# Patient Record
Sex: FEMALE | Race: WHITE
[De-identification: names, ages, dates, MRNs, and addresses within clinical notes are randomized per-mention and may not be internally consistent; named-entity substitution may affect disease eponyms.]

---

## 2017-11-14 ENCOUNTER — HOSPITAL ENCOUNTER (OUTPATIENT)
Dept: HOSPITAL 62 - WI | Age: 62
End: 2017-11-14
Attending: NURSE PRACTITIONER
Payer: MEDICARE

## 2017-11-14 DIAGNOSIS — Z12.31: Primary | ICD-10-CM

## 2017-11-14 PROCEDURE — G0202 SCR MAMMO BI INCL CAD: HCPCS

## 2017-11-14 PROCEDURE — 77067 SCR MAMMO BI INCL CAD: CPT

## 2017-11-14 NOTE — WOMENS IMAGING REPORT
EXAM DESCRIPTION:  BILAT SCREENING MAMMO W/CAD



COMPLETED DATE/TIME:  11/14/2017 7:11 am



REASON FOR STUDY:  SCREENING MAMMO Z12.31  ENCNTR SCREEN MAMMOGRAM FOR MALIGNANT NEOPLASM OF SONYA



COMPARISON:  September 2016



TECHNIQUE:  Standard craniocaudal and mediolateral oblique views of each breast recorded using digita
l acquisition.



LIMITATIONS:  None.



FINDINGS:  No masses, calcifications or architectural distortion. No areas of suspicion.

Read with the assistance of CAD.

.[facility] - R2 Cenova Version 1.3

.[facility] - R2 Cenova Version 1.3

.[facility] - R2 Cenova Version 2.4

.[facility] - R2 Cenova Version 2.4

.[facility] - R2  Version 9.2



IMPRESSION:  NORMAL MAMMOGRAM.  BIRADS 1.



BREAST DENSITY:  b. There are scattered areas of fibroglandular density.



BIRAD:  1 NEGATIVE



RECOMMENDATION:  ROUTINE SCREENING



COMMENT:  The patient has been notified of the results by letter per SA requirements. Additional no
tification policies are in place for contacting patient with suspicious or incomplete findings.

Quality ID #225: The American College of Radiology recommends an annual screening mammogram for women
 aged 40 years or over. This facility utilizes a reminder system to ensure that all patients receive 
reminder letters, and/or direct phone calls for appointments. This includes reminders for routine scr
eening mammograms, diagnostic mammograms, or other Breast Imaging Interventions when appropriate.  Th
is patient will be placed in the appropriate reminder system.

The American College of Radiology (ACR) has developed recommendations for screening MRI of the breast
s in certain patient populations, to be used in conjunction with mammography.  Breast MRI surveillanc
e may be appropriate for women with more than 20% lifetime risk of developing breast cancer  as deter
mined by genetic testing, significant family history of the disease, or history of mantle radiation f
or Hodgkins Disease.  ACR Practice Guidelines 2008.



TECHNICAL DOCUMENTATION:  FINDING NUMBER: (1)

ASSESSMENT: (1)

JOB ID:  [id number]

 2011 [employer]- All Rights Reserved

## 2018-04-04 ENCOUNTER — HOSPITAL ENCOUNTER (EMERGENCY)
Dept: HOSPITAL 62 - ER | Age: 63
Discharge: HOME | End: 2018-04-04
Payer: MEDICARE

## 2018-04-04 VITALS — DIASTOLIC BLOOD PRESSURE: 87 MMHG | SYSTOLIC BLOOD PRESSURE: 123 MMHG

## 2018-04-04 DIAGNOSIS — R50.9: ICD-10-CM

## 2018-04-04 DIAGNOSIS — F17.200: ICD-10-CM

## 2018-04-04 DIAGNOSIS — J20.9: Primary | ICD-10-CM

## 2018-04-04 LAB
ADD MANUAL DIFF: NO
ALBUMIN SERPL-MCNC: 4 G/DL (ref 3.5–5)
ALP SERPL-CCNC: 58 U/L (ref 38–126)
ALT SERPL-CCNC: 26 U/L (ref 9–52)
ANION GAP SERPL CALC-SCNC: 10 MMOL/L (ref 5–19)
AST SERPL-CCNC: 21 U/L (ref 14–36)
BASOPHILS # BLD AUTO: 0.1 10^3/UL (ref 0–0.2)
BASOPHILS NFR BLD AUTO: 1.2 % (ref 0–2)
BILIRUB DIRECT SERPL-MCNC: 0.3 MG/DL (ref 0–0.4)
BILIRUB SERPL-MCNC: 0.3 MG/DL (ref 0.2–1.3)
BUN SERPL-MCNC: 10 MG/DL (ref 7–20)
CALCIUM: 9.2 MG/DL (ref 8.4–10.2)
CHLORIDE SERPL-SCNC: 102 MMOL/L (ref 98–107)
CK MB SERPL-MCNC: 0.43 NG/ML (ref ?–4.55)
CK SERPL-CCNC: 66 U/L (ref 30–135)
CO2 SERPL-SCNC: 29 MMOL/L (ref 22–30)
EOSINOPHIL # BLD AUTO: 0 10^3/UL (ref 0–0.6)
EOSINOPHIL NFR BLD AUTO: 0.5 % (ref 0–6)
ERYTHROCYTE [DISTWIDTH] IN BLOOD BY AUTOMATED COUNT: 14 % (ref 11.5–14)
GLUCOSE SERPL-MCNC: 93 MG/DL (ref 75–110)
HCT VFR BLD CALC: 41.7 % (ref 36–47)
HGB BLD-MCNC: 13.9 G/DL (ref 12–15.5)
LYMPHOCYTES # BLD AUTO: 1.4 10^3/UL (ref 0.5–4.7)
LYMPHOCYTES NFR BLD AUTO: 24.5 % (ref 13–45)
MCH RBC QN AUTO: 31.4 PG (ref 27–33.4)
MCHC RBC AUTO-ENTMCNC: 33.5 G/DL (ref 32–36)
MCV RBC AUTO: 94 FL (ref 80–97)
MONOCYTES # BLD AUTO: 0.6 10^3/UL (ref 0.1–1.4)
MONOCYTES NFR BLD AUTO: 10.7 % (ref 3–13)
NEUTROPHILS # BLD AUTO: 3.6 10^3/UL (ref 1.7–8.2)
NEUTS SEG NFR BLD AUTO: 63.1 % (ref 42–78)
NT PRO BNP: 78 PG/ML (ref 5–900)
PLATELET # BLD: 146 10^3/UL (ref 150–450)
POTASSIUM SERPL-SCNC: 4 MMOL/L (ref 3.6–5)
PROT SERPL-MCNC: 6.6 G/DL (ref 6.3–8.2)
RBC # BLD AUTO: 4.44 10^6/UL (ref 3.72–5.28)
SODIUM SERPL-SCNC: 141.1 MMOL/L (ref 137–145)
TOTAL CELLS COUNTED % (AUTO): 100 %
TROPONIN I SERPL-MCNC: < 0.012 NG/ML
WBC # BLD AUTO: 5.7 10^3/UL (ref 4–10.5)

## 2018-04-04 PROCEDURE — 96374 THER/PROPH/DIAG INJ IV PUSH: CPT

## 2018-04-04 PROCEDURE — 94640 AIRWAY INHALATION TREATMENT: CPT

## 2018-04-04 PROCEDURE — 80053 COMPREHEN METABOLIC PANEL: CPT

## 2018-04-04 PROCEDURE — 87040 BLOOD CULTURE FOR BACTERIA: CPT

## 2018-04-04 PROCEDURE — 84484 ASSAY OF TROPONIN QUANT: CPT

## 2018-04-04 PROCEDURE — 99284 EMERGENCY DEPT VISIT MOD MDM: CPT

## 2018-04-04 PROCEDURE — 82550 ASSAY OF CK (CPK): CPT

## 2018-04-04 PROCEDURE — 83880 ASSAY OF NATRIURETIC PEPTIDE: CPT

## 2018-04-04 PROCEDURE — 85025 COMPLETE CBC W/AUTO DIFF WBC: CPT

## 2018-04-04 PROCEDURE — 36415 COLL VENOUS BLD VENIPUNCTURE: CPT

## 2018-04-04 PROCEDURE — 82553 CREATINE MB FRACTION: CPT

## 2018-04-04 PROCEDURE — 71046 X-RAY EXAM CHEST 2 VIEWS: CPT

## 2018-04-04 PROCEDURE — 93010 ELECTROCARDIOGRAM REPORT: CPT

## 2018-04-04 PROCEDURE — 93005 ELECTROCARDIOGRAM TRACING: CPT

## 2018-04-04 NOTE — ER DOCUMENT REPORT
ED Respiratory Problem





- General


Stated Complaint: COUGH


Time Seen by Provider: 04/04/18 18:05


Mode of Arrival: Medic


Information source: Patient


TRAVEL OUTSIDE OF THE [place] IN LAST 30 DAYS: No





- HPI


Patient complains to provider of: Cough, Other - WEAKNESS, FATIGUE


Onset: Yesterday


Duration: Continuous


Initiating Event: No: Allergy, Aspiration/Choking, Exertion, Exposure to 

chemicals, Exposure to dust, Exposure to fumes, Exposure to mold, Exposure to 

smoke, Out of meds, Sports/exercise, URI, Other


Quality of pain: No pain


Context: denies: DVT, Factor V Leiden, Hx asthma, Hx CHF, Hx COPD, Malignancy, 

Pregnant, Recent cardiac event, Recent foreign travel, Recent long distance trvl

, Recent immobilization, Recent surgery, Smoker


Short of Breath: Moderate


Cough: Productive


Sputum amount: Scant


Sputum color: Yellow


Sputum consistency: Mucoid


At home treatment: denies: Bronchodilators, CPAP, Diuretics, Inhaled steroids, 

Oral steroids, Oxygen, Singulair, Theophylline


EMS treatments: Bronchodilators


Associated symptoms: Cough, Fever - REPORTEDLY FEBRILE @ PCP OFFICE, Short of 

breath.  denies: Chills, Sweaty


Worsened by: ANY EXERTION


Similar symptoms previously: No


Recently seen / treated by doctor: Yes - PCP, THIS AFTERNOON, REFERRED TO E.D.





- Related Data


Allergies/Adverse Reactions: 


 





No Known Allergies Allergy (Verified 10/17/16 07:17)


 











Past Medical History





- General


Information source: Patient





- Social History


Smoking Status: Former Smoker


Cigarette use (# per day): No


Chew tobacco use (# tins/day): No


Smoking Education Provided: No


Frequency of alcohol use: Rare


Drug Abuse: None


Lives with: Alone


Family History: CAD


Patient has suicidal ideation: No


Patient has homicidal ideation: No





- Past Medical History


Cardiac Medical History: Reports: None


   Denies: Hx Coronary Artery Disease, Hx Heart Attack, Hx Hypertension


Pulmonary Medical History: Reports: None, Hx COPD


   Denies: Hx Asthma, Hx Bronchitis, Hx Pneumonia


Neurological Medical History: Reports: Other - NEUROPATHY, LE's.  Denies: Hx 

Cerebrovascular Accident, Hx Seizures


Endocrine Medical History: Reports: None.  Denies: Hx Diabetes Mellitus Type 1, 

Hx Diabetes Mellitus Type 2


Renal/ Medical History: Reports: None


Malignancy Medical History: Reports: None


GI Medical History: Reports: None


Musculoskeltal Medical History: Reports None, Denies Hx Arthritis


Skin Medical History: Reports None


Psychiatric Medical History: Reports: None


Traumatic Medical History: Reports: None


Infectious Medical History: Reports: None


Surgical Hx: Negative





- Immunizations


Hx Diphtheria, Pertussis, Tetanus Vaccination: Yes





Review of Systems





- Review of Systems


Constitutional: See HPI


EENT: No symptoms reported


Cardiovascular: No symptoms reported


Respiratory: See HPI


Gastrointestinal: No symptoms reported


Genitourinary: No symptoms reported


Female Genitourinary: Post menopausal


Musculoskeletal: No symptoms reported


Skin: No symptoms reported


Neurological/Psychological: No symptoms reported





Physical Exam





- Vital signs


Vitals: 


 











Temp Pulse Resp BP Pulse Ox


 


 99.2 F   85   18   105/74   98 


 


 04/04/18 17:47  04/04/18 17:47  04/04/18 17:47  04/04/18 17:47  04/04/18 17:47











Interpretation: Normal.  No: Tachycardic, Hypoxic, Tachypneic, Febrile





- General


General appearance: Appears well, Alert


In distress: None





- HEENT


Head: Normocephalic


Eyes: Normal


Conjunctiva: Normal


Ears: Normal


Nasal: Normal


Mouth/Lips: Normal


Mucous membranes: Normal


Pharynx: Normal


Neck: Normal





- Respiratory


Respiratory status: No respiratory distress


Breath sounds: Wheezing - FEW END EXPIRATORY, ALL FIELDS





- Extremities


General upper extremity: Normal inspection


General lower extremity: Normal inspection.  No: Edema





- Neurological


Neuro grossly intact: Yes


Cognition: Normal


Orientation: AAOx4





- Psychological


Associated symptoms: Normal affect, Normal mood





- Skin


Skin Temperature: Warm


Skin Moisture: Dry


Skin Color: Normal


Skin Turgor: Elastic





Course





- Re-evaluation


Re-evalutation: 





04/04/18 21:32


Patient states she is feeling "much better".  Denies any respiratory symptoms 

at this time.  Pulse ox on room air is about 92-93% at rest.  Results of workup 

discussed.





- Vital Signs


Vital signs: 


 











Temp Pulse Resp BP Pulse Ox


 


 99.2 F   85   18   105/74   96 


 


 04/04/18 17:47  04/04/18 17:47  04/04/18 17:47  04/04/18 17:47  04/04/18 19:04














- Laboratory


Result Diagrams: 


 04/04/18 19:06





 04/04/18 19:06


Laboratory results interpreted by me: 


 











  04/04/18





  19:06


 


Plt Count  146 L














- Diagnostic Test


Radiology reviewed: Image reviewed, Reports reviewed





- EKG Interpretation by Me


EKG shows normal: Sinus rhythm, Axis, Intervals, QRS Complexes - SMALL INF. Q 

WAVES, ? RVH


Rate: Normal


Rhythm: NSR


P Waves: LAE





Discharge





- Discharge


Clinical Impression: 


 Bronchitis with bronchospasm, Tobacco abuse





Condition: Stable


Disposition: HOME, SELF-CARE


Instructions:  Bronchitis With Bronchospasm (Wheezing) ([facility]), Inhaled 

Bronchodilators ([facility]), Corticosteroid Medication ([facility]), Stop Smoking ([facility])


Additional Instructions: 


MEDS AS DIRECTED.


STOP SMOKING.


FOLLOW UP WITH YOUR PRIMARY CARE PROVIDER IN 5-7 DAYS, OR SOONER IF PROBLEMS.


Prescriptions: 


Prednisone [Deltasone 10 mg Tablet] 10 mg PO ASDIR PRN #21 tablet


 PRN Reason: 


Referrals: 


VENTURA PELAYO NP-C [Primary Care Provider] - Follow up as needed

## 2018-04-04 NOTE — RADIOLOGY REPORT (SQ)
EXAM DESCRIPTION:  CHEST 2 VIEWS



COMPLETED DATE/TIME:  4/4/2018 6:27 pm



REASON FOR STUDY:  COUGH, DYSPNEA



COMPARISON:  5/18/2016



EXAM PARAMETERS:  NUMBER OF VIEWS: two views

TECHNIQUE: Digital Frontal and Lateral radiographic views of the chest acquired.

RADIATION DOSE: NA

LIMITATIONS: none



FINDINGS:  LUNGS AND PLEURA: The lungs are hyperexpanded with flattening of the diaphragms.  There is
 no infiltrate, effusion, or mass.

MEDIASTINUM AND HILAR STRUCTURES: No masses or contour abnormalities.

HEART AND VASCULAR STRUCTURES: Heart normal size.  No evidence for failure.

BONES: No acute findings.

HARDWARE: None in the chest.

OTHER: No other significant finding.



IMPRESSION:  Chronic lung changes with no acute cardiopulmonary disease.



TECHNICAL DOCUMENTATION:  JOB ID:  [id number]

 2011 [email or web address]- All Rights Reserved



Reading location - IP/workstation name: [email or web address]

## 2018-11-15 ENCOUNTER — HOSPITAL ENCOUNTER (OUTPATIENT)
Dept: HOSPITAL 62 - WI | Age: 63
End: 2018-11-15
Attending: NURSE PRACTITIONER
Payer: MEDICARE

## 2018-11-15 DIAGNOSIS — Z12.31: Primary | ICD-10-CM

## 2018-11-15 PROCEDURE — 77067 SCR MAMMO BI INCL CAD: CPT

## 2018-11-15 NOTE — WOMENS IMAGING REPORT
EXAM DESCRIPTION:  BILAT SCREENING MAMMO W/CAD



COMPLETED DATE/TIME:  11/15/2018 10:36 am



REASON FOR STUDY:  SCREENING MAMMO Z12.31  ENCNTR SCREEN MAMMOGRAM FOR MALIGNANT NEOPLASM OF SONYA



COMPARISON:  Multiple since 2016



TECHNIQUE:  Standard craniocaudal and mediolateral oblique views of each breast recorded using digita
l acquisition.



LIMITATIONS:  None.



FINDINGS:  No masses, calcifications or architectural distortion. No areas of suspicion.

Read with the assistance of CAD.

.[facility] - R2 Cenova Version 1.3

.[facility] - R2 Cenova Version 1.3

.[facility] - R2 Cenova Version 2.4

.[facility] - R2 Cenova Version 2.4

.[facility] - R2  Version 9.2



IMPRESSION:  NORMAL MAMMOGRAM.  BIRADS 1.



BREAST DENSITY:  c. The breasts are heterogeneously dense, which may obscure small masses.



BIRAD:  1 NEGATIVE



RECOMMENDATION:  ROUTINE SCREENING

Please continue yearly bilateral screening mammography/tomosynthesis in November 2019



COMMENT:  The patient has been notified of the results by letter per SA requirements. Additional no
tification policies are in place for contacting patient with suspicious or incomplete findings.

Quality ID #225: The American College of Radiology recommends an annual screening mammogram for women
 aged 40 years or over. This facility utilizes a reminder system to ensure that all patients receive 
reminder letters, and/or direct phone calls for appointments. This includes reminders for routine scr
eening mammograms, diagnostic mammograms, or other Breast Imaging Interventions when appropriate.  Th
is patient will be placed in the appropriate reminder system.

The American College of Radiology (ACR) has developed recommendations for screening MRI of the breast
s in certain patient populations, to be used in conjunction with mammography.  Breast MRI surveillanc
e may be appropriate for women with more than 20% lifetime risk of developing breast cancer  as deter
mined by genetic testing, significant family history of the disease, or history of mantle radiation f
or Hodgkins Disease.  ACR Practice Guidelines 2008.



TECHNICAL DOCUMENTATION:  FINDING NUMBER: (1)

ASSESSMENT: (1)

JOB ID:  [id number]

 2011 [employer]- All Rights Reserved



Reading location - IP/workstation name: [email or web address]
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
## 2019-08-30 NOTE — WOMENS IMAGING REPORT
EXAM DESCRIPTION:  BONE DENSITY HIP/SPINE



COMPLETED DATE/TIME:  8/30/2019 8:23 am



REASON FOR STUDY:  Z78.0 ASYMPTOMATIC MENOPAUSAL STATE Z13.6  ENCOUNTER FOR SCREENING FOR CARDIOVASCU
LAR DISORDERS Z78.0  ASYMPTOMATIC MENOPAUSAL STATE



COMPARISON:   None.



TECHNIQUE:  Dual-Energy X-ray Absorptiometry (DEXA) of the AP Spine and Hip.



LIMITATIONS:  None.



FINDINGS:  LUMBAR SPINE:

The bone mineral density (BMD) measured from L1-L4 in the AP projection correlates with a T-score of 
-1.9, which is osteopenia as defined by the World Health Organization.

HIP:

The bone mineral density (BMD) measured in the left hip correlates with a T-score of -2.7, which is o
steoporosis as defined by the World Health Organization.



IMPRESSION:  1. LUMBAR SPINE: OSTEOPENIA.

2.  HIP: OSTEOPOROSIS.



COMMENT:  The World Health Organization defines low BMD as follows:

T-score:

Normal:  Greater than -1.0

Osteopenia: Between -1.0 and -2.5

Osteoporosis:  Less than -2.5 without fractures

Established osteoporosis:  Less than -2.5 with fractures

In general, you may wish to consider:

Diagnosis          Treatment                     Follow-up DEXA

Normal BMD      Prevention                    2-3 years

Osteopenia       Prevention/Therapy        1-2 years

Osteoporosis     Therapy                        Yearly



TECHNICAL DOCUMENTATION:  JOB ID:  [id number]

 2011 [employer]- All Rights Reserved



Reading location - IP/workstation name: [email or web address]

## 2019-08-30 NOTE — WOMENS IMAGING REPORT
EXAM DESCRIPTION:  U/S ABDOMINAL AORTA SCREENING



COMPLETED DATE/TIME:  8/30/2019 8:21 am



REASON FOR STUDY:  Z13.6 ENCOUNTER FOR SCREENING FOR CARDIOVASCULAR DISORDERS Z13.6  ENCOUNTER FOR SC
REENING FOR CARDIOVASCULAR DISORDERS Z78.0  ASYMPTOMATIC MENOPAUSAL STATE



COMPARISON:  None.



TECHNIQUE:  Static and dynamic grayscale images acquired of the aorta and stored on PACs. Selected co
jayne Doppler and spectral images recorded.



LIMITATIONS:  None.



FINDINGS:  AORTIC CALIBER MAXIMAL

PROXIMAL: 2.1 x 1.9 cm.

MID: 1.0 x 1.7 cm.

DISTAL:  Obscured by overlying bowel gas.

ILIAC DIAMETER

RIGHT: Obscured by overlying bowel gas.

LEFT: Obscured by overlying bowel gas.

OTHER: No other significant finding.



IMPRESSION:  Proximal and mid aorta are normal in caliber.  Distal aorta is obscured by overlying bow
el gas.



COMMENT:  Aortic aneurysm imaging followup:

Incomplete evaluation.  Follow-up studies recommended to evaluate due distal aorta and iliacs.

*Based upon the Society for Vascular Surgery Guidelines: J Vasc Surg. 2009 Oct;50(4 Suppl):S2-49

*For aortas of maximum diameter of 2.6-2.9 cm meeting the criteria for AAA (?1.5 x proximal normal se
gment)



TECHNICAL DOCUMENTATION:  JOB ID:  [id number]

 2011 [employer]- All Rights Reserved



Reading location - IP/workstation name: [email or web address]

## 2019-10-10 NOTE — RADIOLOGY REPORT (SQ)
EXAM DESCRIPTION:  KUB/ABDOMEN (SINGLE VIEW)



COMPLETED DATE/TIME:  10/10/2019 11:27 am



REASON FOR STUDY:  pain, swelling, incontinence



COMPARISON:  None.



NUMBER OF VIEWS:  One view.



TECHNIQUE:   Supine radiographic image of the abdomen acquired.



LIMITATIONS:  None.



FINDINGS:  BOWEL GAS PATTERN: Nonobstructive gas pattern.  Considerable stool is present.

CALCIFICATIONS: No suspicious calcifications.

SOFT TISSUES: No gross mass or suggestion of organomegaly.

HARDWARE: None in the abdomen.

BONES: No acute fracture. No worrisome bone lesions.

OTHER: No other significant finding.



IMPRESSION:  Constipation.



TECHNICAL DOCUMENTATION:  JOB ID:  [id number]

 2011 [employer]- All Rights Reserved



Reading location - IP/workstation name: [email or web address]

## 2019-10-10 NOTE — ER DOCUMENT REPORT
ED Medical Screen (RME)





- General


Stated Complaint: ABDOMINAL PAIN/SWELLING


Time Seen by Provider: 10/10/19 10:56


Primary Care Provider: 


JALEN NOEL FNP-C [Primary Care Provider] - Follow up as needed


Mode of Arrival: Ambulatory


Information source: Patient


Notes: 





This 64-year-old female with a history of COD and possible constipation reports 

to the emergency department with abdominal swelling and reports that when she 

passes gas she is incontinent of stool for over a month.  She reports she is 

being treated by her primary care provider with multiple medications for 

constipation.  Patient reports her primary care provider told her they are 

waiting for her colon to settle down to check to see if she has an aneurysm.  

She denies fever reports some nausea.








I have greeted and performed a rapid initial assessment of this patient.  A 

comprehensive ED assessment and evaluation of the patient, analysis of test 

results and completion of the medical decision making process will be conducted 

by additional ED providers.  Dictation of this chart was performed using voice 

recognition software; therefore, there may be some unintended grammatical 

errors.


TRAVEL OUTSIDE OF THE [place] IN LAST 30 DAYS: No





- Related Data


Allergies/Adverse Reactions: 


                                        





No Known Allergies Allergy (Verified 10/10/19 10:58)


   











Past Medical History





- Past Medical History


Cardiac Medical History: 


   Denies: Hx Coronary Artery Disease, Hx Heart Attack, Hx Hypertension


Pulmonary Medical History: Reports: Hx COPD


   Denies: Hx Asthma, Hx Bronchitis, Hx Pneumonia


Neurological Medical History: Denies: Hx Cerebrovascular Accident, Hx Seizures


Endocrine Medical History: Denies: Hx Diabetes Mellitus Type 1, Hx Diabetes 

Mellitus Type 2


Renal/ Medical History: Denies: Hx Peritoneal Dialysis


Musculoskeltal Medical History: Denies Hx Arthritis





- Immunizations


Hx Diphtheria, Pertussis, Tetanus Vaccination: Yes





Physical Exam





- Vital signs


Vitals: 





                                        











Temp Pulse Resp BP Pulse Ox


 


 97.7 F   77   20   120/63   96 


 


 10/10/19 10:40  10/10/19 10:40  10/10/19 10:40  10/10/19 10:40  10/10/19 10:40














Course





- Vital Signs


Vital signs: 





                                        











Temp Pulse Resp BP Pulse Ox


 


 97.7 F   77   20   120/63   96 


 


 10/10/19 10:40  10/10/19 10:40  10/10/19 10:40  10/10/19 10:40  10/10/19 10:40














Doctor's Discharge





- Discharge


Referrals: 


JALEN NOEL, ZLUEYMAP-C [Primary Care Provider] - Follow up as needed

## 2019-10-10 NOTE — ER DOCUMENT REPORT
ED General





- General


Chief Complaint: Abdominal Pain


Stated Complaint: ABDOMINAL PAIN/SWELLING


Time Seen by Provider: 10/10/19 10:56


Primary Care Provider: 


JALEN NOEL FNP-C [NO LOCAL MD] - Follow up as needed


Mode of Arrival: Ambulatory


TRAVEL OUTSIDE OF THE [place] IN LAST 30 DAYS: No





- HPI


Notes: 





Patient eloped prior to this provider being able to see patient at bedside.  Was

unable to obtain in HPI, review of systems, physical exam, disposition. 





- Related Data


Allergies/Adverse Reactions: 


                                        





No Known Allergies Allergy (Verified 10/10/19 10:58)


   











Past Medical History





- General


Information source: Patient





- Social History


Smoking Status: Current Every Day Smoker


Chew tobacco use (# tins/day): No


Frequency of alcohol use: Sober since 2015


Drug Abuse: None


Family History: CAD


Patient has suicidal ideation: No


Patient has homicidal ideation: No





- Past Medical History


Cardiac Medical History: 


   Denies: Hx Coronary Artery Disease, Hx Heart Attack, Hx Hypertension


Pulmonary Medical History: Reports: Hx COPD


   Denies: Hx Asthma, Hx Bronchitis, Hx Pneumonia


Neurological Medical History: Denies: Hx Cerebrovascular Accident, Hx Seizures


Endocrine Medical History: Denies: Hx Diabetes Mellitus Type 1, Hx Diabetes 

Mellitus Type 2


Renal/ Medical History: Denies: Hx Peritoneal Dialysis


Musculoskeletal Medical History: Denies Hx Arthritis


Past Surgical History: Reports: Hx Hysterectomy





- Immunizations


Hx Diphtheria, Pertussis, Tetanus Vaccination: Yes





Review of Systems





- Review of Systems


Notes: 





Unable to obtain due to patient eloping prior to this provider seeing patient





Physical Exam





- Vital signs


Vitals: 


                                        











Temp Pulse Resp BP Pulse Ox


 


 97.7 F   77   20   120/63   96 


 


 10/10/19 10:40  10/10/19 10:40  10/10/19 10:40  10/10/19 10:40  10/10/19 10:40








Patient eloped prior to this provider being able to perform physical exam on 

patient





Course





- Vital Signs


Vital signs: 


                                        











Temp Pulse Resp BP Pulse Ox


 


 97.7 F   77   20   120/63   96 


 


 10/10/19 10:40  10/10/19 10:40  10/10/19 10:40  10/10/19 10:40  10/10/19 10:40














- Laboratory


Result Diagrams: 


                                 10/10/19 11:08





                                 10/10/19 11:08


Laboratory results interpreted by me: 


                                        











  10/10/19 10/10/19





  10:55 11:08


 


Carbon Dioxide   31 H


 


Ur Leukocyte Esterase  TRACE H 


 


Urine Ascorbic Acid  20 H 














Discharge





- Discharge


Clinical Impression: 


 Abdominal pain





Condition: Stable


Disposition: ELOPED


Referrals: 


JALEN NOEL FNP-C [NO LOCAL MD] - Follow up as needed

## 2019-10-10 NOTE — ER DOCUMENT REPORT
ED Medical Screen (RME)





- General


Chief Complaint: Constipation


Stated Complaint: ABDOMINAL PAIN


Time Seen by Provider: 10/10/19 21:14


Mode of Arrival: Medic


Information source: Patient


Notes: 





Patient presents again for the second time today with complaints of swollen 

abdomen abdominal pain reports history of constipation.  Patient was evaluated 

earlier today but she became angry.  She checked out.  She returned today via 

EMS because she still hurting.  She contacted her primary care provider who told

her to take MiraLAX.  She has not had a bowel movement yet.





I have greeted and performed a rapid initial assessment of this patient.  A 

comprehensive ED assessment and evaluation of the patient, analysis of test 

results and completion of the medical decision making process will be conducted 

by additional ED providers.


Dictation of this chart was performed using voice recognition software; 

therefore, there may be some unintended grammatical errors.


TRAVEL OUTSIDE OF THE [place] IN LAST 30 DAYS: No





- Related Data


Allergies/Adverse Reactions: 


                                        





No Known Allergies Allergy (Verified 10/10/19 10:58)


   











Past Medical History





- Past Medical History


Cardiac Medical History: 


   Denies: Hx Coronary Artery Disease, Hx Heart Attack, Hx Hypertension


Pulmonary Medical History: Reports: Hx COPD


   Denies: Hx Asthma, Hx Bronchitis, Hx Pneumonia


Neurological Medical History: Denies: Hx Cerebrovascular Accident, Hx Seizures


Endocrine Medical History: Denies: Hx Diabetes Mellitus Type 1, Hx Diabetes 

Mellitus Type 2


Renal/ Medical History: Denies: Hx Peritoneal Dialysis


Musculoskeltal Medical History: Denies Hx Arthritis


Past Surgical History: Reports: Hx Hysterectomy





- Immunizations


Hx Diphtheria, Pertussis, Tetanus Vaccination: Yes

## 2019-12-10 NOTE — WOMENS IMAGING REPORT
EXAM DESCRIPTION:  3D SCREENING MAMMO BILAT



COMPLETED DATE/TIME:  12/10/2019 8:30 am



REASON FOR STUDY:  ROUTINE SCREENING MAMMOGRAM Z12.31 Z12.39  ENCOUNTER FOR OTH SCREENING FOR MALIGNA
NT NEOPLASM OF



COMPARISON:  2016 and subsequent.



EXAM PARAMETERS:  Views: Standard craniocaudal and mediolateral oblique views of each breast recorded
 using digital acquisition and breast tomosynthesis.

Read with the assistance of CAD.

.[employer]  Version 9.2



LIMITATIONS:  None.



FINDINGS:  No suspicious masses, suspicious calcifications or architectural distortion. No areas of c
oncern.



IMPRESSION:   NEGATIVE MAMMOGRAM. BIRADS 1.



BREAST DENSITY:  c. The breasts are heterogeneously dense, which may obscure small masses.



BIRAD:  ASSESSMENT:  1 NEGATIVE



RECOMMENDATION:  ROUTINE SCREENING



COMMENT:  The patient has been notified of the results by letter per MQSA requirements. Additional no
tification policies are in place for contacting patient with suspicious or incomplete findings.

Quality ID #225: The American College of Radiology recommends an annual screening mammogram for women
 aged 40 years or over. This facility utilizes a reminder system to ensure that all patients receive 
reminder letters, and/or direct phone calls for appointments. This includes reminders for routine scr
eening mammograms, diagnostic mammograms, or other Breast Imaging Interventions when appropriate.  Th
is patient will be placed in the appropriate reminder system.



TECHNICAL DOCUMENTATION:  FINDING NUMBER: (1)

ASSESSMENT:  (1)

JOB ID:  [id number]

 2011 [employer]- All Rights Reserved



Reading location - IP/workstation name: [email or web address]

## 2020-08-20 NOTE — OPERATIVE REPORT
Operative Report


DATE OF SURGERY: 08/20/20


Operative Report: 





The risk, benefits and alternatives of the procedure including the risk of 

bleeding, perforation requiring surgery have been explained to the patient in 

detail and informed consent has been obtained.  Patient is taken back to the 

endoscopy suite and placed in a left, lateral decubital position.  Timeout was 

called.  Propofol medication is administered.  Rectal examination is done which 

did not reveal any masses, tears or fissures.  An Olympus videoscope was 

introduced into the patient's rectum.  The scope was then carefully advanced all

the way to the cecum.  The cecum was identified by the usual anatomical 

landmarks of the ileocecal valve as well as the appendiceal office.  

Photodocumentation is obtained.  The scope was then sequentially pulled back via

the various segments of the colon including the ascending colon, hepatic 

flexure, transverse colon, splenic flexure, descending colon and finally into 

the rectosigmoid portions of the colon.  Retroflexion maneuvers performed.


PREOPERATIVE DIAGNOSIS: Change of bowel habits


POSTOPERATIVE DIAGNOSIS: Rectal polyps x2 removed via biopsy forceps.  Internal 

hemorrhoids.  Right colon inflammation status post biopsy.  No obstruction noted


OPERATION: Colonoscopy with biopsy


SURGEON: SHI EAST


ANESTHESIA: LMAC


TISSUE REMOVED OR ALTERED: As noted above


COMPLICATIONS: 





None.


ESTIMATED BLOOD LOSS: None.


INTRAOPERATIVE FINDINGS: As noted above.


PROCEDURE: 





Patient tolerated the procedure well.


No immediate postprocedure complications are noted.


Patient is discharged in good condition.


Discharge date 8/20/2020.


Discharge diet: Regular.


Discharge activity: Regular.


2 to 3-week follow-up to discuss findings.


Patient is instructed call the office or proceed to the emergency room should 

there be any further problems or questions.


Wait on the pathology.


Consider 5-year surveillance colonoscopy

## 2020-09-01 NOTE — RADIOLOGY REPORT (SQ)
EXAM DESCRIPTION:  NM GASTRIC EMPTYING STUDY



IMAGES COMPLETED DATE/TIME:  9/1/2020 11:50 am



REASON FOR STUDY:  ABDOMINAL DISENTION R14.0  ABDOMINAL DISTENSION (GASEOUS)



COMPARISON:  None.



RADIONUCLIDE AND DOSE:  2.11 millicuries Tc-99m Sulfur Colloid.

Egg salad sandwich

The route of agent administration: Oral.



TECHNIQUE:  1 minute serial static imaging performed at time of meal, 1 hour, 2 hours, 3 hours, and 4
 hours as needed. Once stomach reaches 90% emptying, the test is complete. Image intensity values plo
tted with respect to time with linear regression algorithm.



LIMITATIONS:  None.



FINDINGS:  Patient was observed for 4 hours.

Immediate post meal serves as baseline.

Gastric emptying at 30 minutes was 46%

Gastric emptying at 60 minutes was 59%.

Gastric emptying at 90 minutes was 67%.

Gastric emptying at 120 minutes was 73%

Gastric emptying at 240 minutes was 87%.

Normal values:

60 minutes: 30-90% retained. If less than 30%, abnormally rapid emptying. If greater than 90%, delaye
d gastric emptying.

120 minutes: <60% retained. If greater than 60%, delayed gastric emptying.

240 minutes: <10% retained. If greater than 10%, delayed gastric emptying.



IMPRESSION:  SLIGHTLY DELAYED GASTRIC EMPTYING AT 4 HOURS.



TECHNICAL DOCUMENTATION:  JOB ID:  [id number]

 2011 [employer]- All Rights Reserved                          rev-5/18



Reading location - IP/workstation name: [email or web address] Trilobed Flap Text: The defect edges were debeveled with a #15 scalpel blade.  Given the location of the defect and the proximity to free margins a trilobed flap was deemed most appropriate.  Using a sterile surgical marker, an appropriate trilobed flap drawn around the defect.    The area thus outlined was incised deep to adipose tissue with a #15 scalpel blade.  The skin margins were undermined to an appropriate distance in all directions utilizing iris scissors.

## 2020-09-24 NOTE — RADIOLOGY REPORT (SQ)
EXAM DESCRIPTION:  CT ABD/PELVIS NO ORAL OR IV



IMAGES COMPLETED DATE/TIME:  9/24/2020 11:32 am



REASON FOR STUDY:  right hip and lower back pain



COMPARISON:  None.



TECHNIQUE:  CT scan of the abdomen and pelvis performed without intravenous or oral contrast. Images 
reviewed with lung, soft tissue, and bone windows. Reconstructed coronal and sagittal MPR images revi
ewed. All images stored on PACS.

All CT scanners at this facility use dose modulation, iterative reconstruction, and/or weight based d
osing when appropriate to reduce radiation dose to as low as reasonably achievable (ALARA).

[facility]: Dose Right  [facility]: CareDose    [facility]: Dose Right    [facility]: Teradose 4D    [facility]: Smart Technologies



RADIATION DOSE:  CT Rad equipment meets quality standard of care and radiation dose reduction techniq
ues were employed. CTDIvol: 5.4 mGy. DLP: 294 mGy-cm.mGy.



LIMITATIONS:  None.



FINDINGS:  LOWER CHEST: 5 mm ground-glass nodule right lower lobe image 12.

NON-CONTRASTED LIVER, SPLEEN, ADRENALS: Evaluation limited by lack of IV contrast. No identified sign
ificant masses.

PANCREAS: No masses. No peripancreatic inflammatory changes.

GALLBLADDER: No identified stones by CT criteria. No inflammatory changes to suggest cholecystitis.

RIGHT KIDNEY AND URETER: No suspicious masses. Assessment limited by lack of IV contrast.   No signif
icant calcifications.   No hydronephrosis or hydroureter.

LEFT KIDNEY AND URETER: No suspicious masses. Assessment limited by lack of IV contrast.   No signifi
cant calcifications.   No hydronephrosis or hydroureter.

AORTA AND RETROPERITONEUM: No aneurysm. No retroperitoneal masses or adenopathy.

BOWEL AND PERITONEAL CAVITY: No obvious masses or inflammatory changes. No free fluid.

APPENDIX: Not visualized.

PELVIS, BLADDER, AND ABDOMINAL WALL:No abnormal masses. No free fluid. Bladder normal.

BONES: Chronic appearing compression fracture of L1.

OTHER: No other significant finding.



IMPRESSION:  No acute findings.  Incidental small pulmonary nodule.



COMMENT:  Quality ID # 436: Final reports with documentation of one or more dose reduction techniques
 (e.g., Automated exposure control, adjustment of the mA and/or kV according to patient size, use of 
iterative reconstruction technique)



TECHNICAL DOCUMENTATION:  JOB ID:  [id number]

 2011 [employer]- All Rights Reserved



Reading location - IP/workstation name: [email or web address]

## 2020-09-24 NOTE — ER DOCUMENT REPORT
ED General





- General


Chief Complaint: Leg Pain


Stated Complaint: LEG PAIN


Time Seen by Provider: 09/24/20 09:19


Primary Care Provider: 


DEMETRICE HALE NP [Primary Care Provider] - Follow up as needed


TRAVEL OUTSIDE OF THE [place] IN LAST 30 DAYS: No





- HPI


Notes: 





Chief complaint: Low back and right lower extremity pain





History of present illness: 65-year-old female presents today for new onset 

right-sided lower back pain radiating into right lower extremity starting last 

night.  No trauma or unaccustomed exercise.  Denies any past history of 

sciatica.  Denies any bowel bladder dysfunction.  Denies any sensory loss.  She 

does have a history of peripheral neuropathy and says that she has had a 

"pins-and-needles" sensation over her right lateral thigh area for several 

months.  She thinks this is getting a little bit worse.  She is presently taking

Neurontin.  She is not diabetic.  She is a smoker.  Pain is presently described 

as 10/10 intensity.  She says this is gotten gradually worse since last night.  

She denies any skin rash.  No fever or chills.





- Related Data


Allergies/Adverse Reactions: 


                                        





No Known Allergies Allergy (Verified 09/24/20 09:26)


   











Past Medical History





- General


Information source: Patient, [facility]





- Social History


Smoking Status: Current Every Day Smoker


Frequency of alcohol use: None


Drug Abuse: None


Family History: CAD


Patient has homicidal ideation: No





- Past Medical History


Cardiac Medical History: Reports: Hx Coronary Artery Disease, Hx 

Hypercholesterolemia


   Denies: Hx Heart Attack, Hx Hypertension


Pulmonary Medical History: Reports: Hx COPD


   Denies: Hx Asthma, Hx Bronchitis, Hx Pneumonia


Neurological Medical History: Denies: Hx Cerebrovascular Accident, Hx Seizures


Endocrine Medical History: Denies: Hx Diabetes Mellitus Type 1, Hx Diabetes 

Mellitus Type 2


Renal/ Medical History: Denies: Hx Peritoneal Dialysis


Musculoskeletal Medical History: Denies Hx Arthritis


Past Surgical History: Reports: Hx Hysterectomy





- Immunizations


Hx Diphtheria, Pertussis, Tetanus Vaccination: Yes





Review of Systems





- Review of Systems


Notes: 





Constitutional: Negative for fever.


HENT: Negative for sore throat.


Eyes: Negative for visual changes.


Cardiovascular: Negative for chest pain.


Respiratory: Negative for shortness of breath.


Gastrointestinal: Negative for abdominal pain, vomiting or diarrhea.


Genitourinary: Negative for dysuria.


Musculoskeletal: As per HPI.


Skin: Negative for rash.


Neurological: As per HPI.





10 point ROS negative except as marked above and in HPI.








Physical Exam





- Vital signs


Vitals: 





                                        











Temp Pulse Resp BP Pulse Ox


 


 98.3 F   61   17   140/62 H  95 


 


 09/24/20 09:16  09/24/20 09:16  09/24/20 09:16  09/24/20 09:16  09/24/20 09:16














- Notes


Notes: 











GENERAL: Slender female of approximately stated age who appears moderately 

uncomfortable.





SKIN: Good turgor no rashes.





HEAD: Normocephalic atraumatic.





EYES: PERRLA.  EOMI.  Conjunctivae and sclerae clear.





EARS: CANALS AND TMS CLEAR.





NOSE: CLEAR.





MOUTH: Moist mucosa.  Good dentition.  No stridor or edema.  No drooling.





NECK: Supple.  No masses or thyromegaly.  No adenopathy.  Carotids 2+ without 

bruits.  No JVD.





BACK: Symmetrical with mild paraspinous tenderness right lumbar area.  Positive 

right-sided straight leg raising test at 15 degrees.  Opposite straight leg 

raising test is negative.





CHEST: Respirations unlabored.  Breath sounds clear and symmetrical.





HEART: Regular rhythm.  No murmur gallop or rub.





ABDOMEN: Soft nontender without masses, organomegaly or rebound.  Bowel sounds 

normally active.  No bruits.





GENITALIA: Deferred.





EXTREMITIES: Mild clubbing of digits on both hands.  Moderate degenerative 

changes interphalangeal joints of both hands.  No edema.  No calf tenderness.  

Cap refill less than 1.5 seconds.  Dorsalis pedis and posterior tibial pulses 2+

and symmetrical.





NEUROLOGICAL: GCS 15.  Alert and oriented x3.  Normal gait.  Fluent speech.  

Cranial nerves II through XII intact.  Sensorimotor and cerebellar normal.  

Normal tone.





PSYCHIATRIC: Appropriate affect.





Course





- Re-evaluation


Re-evalutation: 





09/24/20 12:58


Clinically the patient appears to have new onset sciatica.  She received IV 

morphine and Zofran with good relief of pain.  Labs unremarkable.  CT imaging of

abdomen and pelvis negative per radiologist.  No evidence of fracture.  No 

evidence of dissection.





Patient was pain-free on reexamination.  I have given her some IV Solu-Medrol 

here.  I will send her out on oral prednisone and Flexeril with some tramadol 

for supplemental pain relief.  Recommend follow-up with PMD.  We reviewed red 

flag symptoms to watch for.





Findings, clinical impression and plan of treatment have been discussed with 

patient/family.  Understanding of current findings and recommendations has been 

acknowledged by them and there is agreement regarding disposition and follow-up.





- Vital Signs


Vital signs: 





                                        











Temp Pulse Resp BP Pulse Ox


 


 98.2 F   56 L  18   125/55 L  95 


 


 09/24/20 11:18  09/24/20 11:18  09/24/20 11:18  09/24/20 11:18  09/24/20 11:18














- Laboratory


Result Diagrams: 


                                 09/24/20 11:08





                                 09/24/20 11:08





- Diagnostic Test


Radiology reviewed: Reports reviewed - Noncontrast CT abdomen/pelvis negative 

per radiologist.





Discharge





- Discharge


Clinical Impression: 


Sciatica


Qualifiers:


 Laterality: right Qualified Code(s): M54.31 - Sciatica, right side





Disposition: HOME, SELF-CARE


Additional Instructions: 


Sciatica





     Your symptoms suggest "sciatica."  The pain of sciatica typically radiates 

down the leg.  Numbness in the foot or calf may also occur. Sciatica is caused 

by irritation of the sciatic nerve or its branches. The irritation can be due to

a herniated disk in the spine, swelling and inflammation in the muscles 

surrounding the sciatic nerve, or direct injury of the nerve itself.


     Most cases of sciatica will resolve with medical treatment. Bed rest is 

usually recommended initially.  Surgery is only necessary when the condition 

will not improve with rest and antiinflammatory medication.  Muscle relaxers are

often given if muscle soreness is present.


     A CAT scan of the back may be performed if a herniated disk is suspected.


     Re-examination is necessary if you develop increasing numbness, localized 

weakness in the foot or ankle, or if the pain does not respond to rest.








Take prescribed medications as instructed.





Light activity at home to avoid prolonged walking and standing and any bending 

stooping or lifting.





I am strongly advising you to stop smoking.





Follow-up with your primary care physician within the next 1 week.





Return here as needed for new or worsening symptoms:





Pain that is worsening or unimproved


Uncontrolled vomiting


High fever or shaking chills


Overall worsening


Prescriptions: 


Cyclobenzaprine HCl [Flexeril 10 mg Tablet] 10 mg PO QHS PRN #15 tablet


 PRN Reason: 


Prednisone [Deltasone 20 mg Tablet] 2 tab PO DAILY 5 Days  tablet


Oxycodone HCl/Acetaminophen [Percocet 5-325 mg Tablet] 1 - 2 tab PO Q4H PRN #15 

tablet


 PRN Reason: 


Forms:  Smoking Cessation Education


Referrals: 


DEMETRICE HALE NP [Primary Care Provider] - Follow up as needed

## 2020-10-07 NOTE — RADIOLOGY REPORT (SQ)
EXAM DESCRIPTION:  MRI LUMBAR SPINE WITHOUT



IMAGES COMPLETED DATE/TIME:  10/7/2020 9:03 am



REASON FOR STUDY:  COMPRESSION FX OF C1 VERTEBRA W/DELAYED HEALING S12.090G  OTH DISP FX OF FIRST CER
VCAL VERT, SUBS FOR FX W DE



COMPARISON:  9/24/2020



TECHNIQUE:  Sagittal and Axial imaging includes T1, T2, STIR and gradient echo sequences. Coronal T2/
HASTE imaging.



LIMITATIONS:  None.



FINDINGS:  VISUALIZED UPPER ABDOMEN:  Limited evaluation. No acute or suspicious findings suggested.

SEGMENTATION: No transitional anatomy. The lowest well-developed disc space is labeled L5-S1.

ALIGNMENT: Focal kypho assist at the L1 level on the basis of wedge compression deformity.

VERTEBRAE: Chronic wedge compression deformity of the L1 vertebral body.

BONE MARROW: Normal. No marrow replacement or reactive changes.

DISC SIGNAL: Disc desiccation and loss of height is seen at all levels.

POSTERIOR ELEMENTS:  Generally intact.  No pars defect evident.

HARDWARE: None in the spine.

CORD AND CONUS: Normal in size and signal intensity. Conus at the appropriate level.

SOFT TISSUES: No aortic aneurysm seen. No bulky retroperitoneal adenopathy or mass. No paraspinal mas
s or fluid.

L1-L2: Circumferential disc bulge with minimal neural foraminal narrowing.  The central canal remains
 widely patent.

L2-L3: Circumferential disc bulge results in moderate right neural foraminal narrowing.  The central 
canal and left neural foramen remain widely patent.

L3-L4: Circumferential disc bulge in the setting of facet arthropathy results in moderate bilateral n
eural foraminal narrowing with the appearance of contact of the exiting left L3 nerve root.  The cent
ral canal remains patent.

L4-L5: Circumferential disc bulge in the setting of facet arthropathy results severe left, moderate r
ight neural foraminal stenosis.  Central canal remains patent.

L5-S1: Circumferential disc bulge in the setting of facet arthropathy results in severe bilateral kole
ral foraminal stenosis.  The central canal remains patent.

LOWER THORACIC: Incompletely imaged.  Moderate neural foraminal stenosis is seen of the left T12/L1 l
evel.

SACRUM: Visualized upper sacrum intact.

OTHER: No other significant findings.



IMPRESSION:  Chronic L1 wedge compression deformity.  Background of multilevel spondylotic changes wi
th significant neural foraminal stenosis at several levels as detailed above.



TECHNICAL DOCUMENTATION:  JOB ID:  [id number]

 [employer]- All Rights Reserved



Reading location - IP/workstation name: [email or web address]

## 2020-12-22 NOTE — OPERATIVE REPORT
Operative Report


DATE OF SURGERY: 12/22/20


Operative Report: 





The risks benefits and alternatives of the procedure explained to the patient in

detail and informed consent is obtained.A  GIF Olympus video scope was inserted 

into the patient's mouth and hypopharynx ,the esophagus is identified intubated 

and insufflated ,the scope was then advanced through the esophagus stomach and 

duodenum, retroflexion maneuver is done ,the esophagus stomach and first and 

second portions of the duodenum examined


PREOPERATIVE DIAGNOSIS: Nausea vomiting, gastroparesis


POSTOPERATIVE DIAGNOSIS: Gastritis status post biopsy.  Submucosal injection 

Botox; 100 units in 4 mL  for 25 unit/cc injection at the gastric outlet


OPERATION: EGD with submucosal injection.  EGD with biopsy


SURGEON: SHI EAST


ANESTHESIA: LMAC


TISSUE REMOVED OR ALTERED: As noted above.


COMPLICATIONS: 





None.


ESTIMATED BLOOD LOSS: None.


INTRAOPERATIVE FINDINGS: As noted above.


PROCEDURE: 





Patient tolerated the procedure well.


No immediate postprocedure complications are noted.


Patient is discharged in good condition.


Discharge date 12/22/2020.


Discharge diet: Regular.


Discharge activity: Regular.


2 to 3-week follow-up to discuss findings.


Patient is instructed to call the office or proceed to the emergency room should

there be any further problems or questions.


Wait on the pathology.

## 2020-12-23 NOTE — WOMENS IMAGING REPORT
EXAM DESCRIPTION:  BILAT SCREENING MAMMO W/CAD



IMAGES COMPLETED DATE/TIME:  12/23/2020 9:24 am



REASON FOR STUDY:  ROUTINE SCREENING MAMMOGRAM Z12.31 Z12.31  ENCNTR SCREEN MAMMOGRAM FOR MALIGNANT N
EOPLASM OF SONYA



COMPARISON:  12/10/2019 and 11/15/2018.



EXAM PARAMETERS:  Standard craniocaudal and mediolateral oblique views of each breast recorded using 
digital acquisition.

Read with the assistance of CAD.

.[facility] - [employer]  Version 9.2



LIMITATIONS:  None.



FINDINGS:  No suspicious masses, suspicious calcifications or architectural distortion. No areas of c
oncern.



IMPRESSION:  NEGATIVE MAMMOGRAM.  BIRADS 1



BREAST DENSITY:  b. There are scattered areas of fibroglandular density.



BIRAD:  ASSESSMENT:  1 NEGATIVE



RECOMMENDATION:  ROUTINE SCREENING



COMMENT:  The patient has been notified of the results by letter per MQSA requirements. Additional no
tification policies are in place for contacting patient with suspicious or incomplete findings.

Quality ID #225: The American College of Radiology recommends an annual screening mammogram for women
 aged 40 years or over. This facility utilizes a reminder system to ensure that all patients receive 
reminder letters, and/or direct phone calls for appointments. This includes reminders for routine scr
eening mammograms, diagnostic mammograms, or other Breast Imaging Interventions when appropriate.  Th
is patient will be placed in the appropriate reminder system.



TECHNICAL DOCUMENTATION:  FINDING NUMBER: (1)

ASSESSMENT: (1)

JOB ID:  [id number]

 2011 [employer]- All Rights Reserved



Reading location - IP/workstation name: [email or web address]

## 2021-01-29 NOTE — RADIOLOGY REPORT (SQ)
EXAM DESCRIPTION:  BARIUM SWALLOW ESOPHAGUS



IMAGES COMPLETED DATE/TIME:  1/29/2021 9:14 am



REASON FOR STUDY:  Z01.810 ENCOUNTER FOR PREPROCEDURAL CARDIOVASCULAR EXAMINATION Z01.810  ENCOUNTER 
FOR PREPROCEDURAL CARDIOVASCULAR EXAMINATI



COMPARISON:  None.



TECHNIQUE:  Under fluoroscopic guidance, patient ingested effervescent granules followed by thick and
 thin barium. Fluoroscopic spot images and routine radiographic images acquired and stored on PACS.

12 MM BARIUM TABLET GIVEN: Barium tablet passed through the esophagus into the stomach without delay.




LIMITATIONS:  None.



FLUOROSCOPY TIME:  FLUORO TIME: 2.3 minutes

8 images saved to PACS.



FINDINGS:  NEUROMUSCULAR COORDINATION OF SWALLOW: Normal. No aspiration.

ESOPHAGEAL MOTILITY: Normal peristalsis. No esophageal spasm.

ESOPHAGEAL MUCOSA: Normal mucosa without masses or ulceration.

GASTRO-ESOPHAGEAL JUNCTION: Small hiatal hernia is present.  No gastroesophageal reflux seen.

NON-GI TRACT STRUCTURES: No significant finding.

OTHER: No other significant finding.



IMPRESSION:  SMALL HIATAL HERNIA.  OTHERWISE UNREMARKABLE STUDY.



RECOMMENDATION:  NONE



COMMENT:  None

Quality :  Final reports for procedures using fluoroscopy that document radiation exposure yon
reza, or exposure time and number of fluorographic images (if radiation exposure indices are not avail
able)



TECHNICAL DOCUMENTATION:  JOB ID:  [id number]

 2011 [employer]- All Rights Reserved



Reading location - IP/workstation name: [facility]